# Patient Record
Sex: MALE | Race: AMERICAN INDIAN OR ALASKA NATIVE
[De-identification: names, ages, dates, MRNs, and addresses within clinical notes are randomized per-mention and may not be internally consistent; named-entity substitution may affect disease eponyms.]

---

## 2019-07-09 ENCOUNTER — HOSPITAL ENCOUNTER (EMERGENCY)
Dept: HOSPITAL 5 - ED | Age: 39
Discharge: HOME | End: 2019-07-09
Payer: COMMERCIAL

## 2019-07-09 VITALS — SYSTOLIC BLOOD PRESSURE: 135 MMHG | DIASTOLIC BLOOD PRESSURE: 81 MMHG

## 2019-07-09 DIAGNOSIS — R21: Primary | ICD-10-CM

## 2019-07-09 DIAGNOSIS — F17.200: ICD-10-CM

## 2019-07-09 PROCEDURE — 99282 EMERGENCY DEPT VISIT SF MDM: CPT

## 2019-07-09 NOTE — EMERGENCY DEPARTMENT REPORT
ED Rash [facility]





- [facility]


Chief Complaint: Skin Rash


Stated Complaint: BOTH HANDS PAIN


Time Seen by Provider: 07/09/19 21:12


Location: Upper Extremities (bilateral fingers)


Suspected Cause: Unknown


Rash Symptoms: Yes Blistering, No Itching, No Facial Swelling, No Tongue/Oral 

Swelling, No Breathing Difficulties, No Choking Sensation, No Wheezing/Dyspnea, 

No Peeling, No Fever, No Lightheaded, No Malaise, No Myalgias


Severity: mild





ED Review of Systems


ROS: 


Stated complaint: BOTH HANDS PAIN


Other details as noted in HPI








ED Past Medical Hx





- Past Medical History


Previous Medical History?: No





- Surgical History


Past Surgical History?: No





- Social History


Smoking Status: Current Every Day Smoker


Substance Use Type: Alcohol





- Medications


Home Medications: 


                                Home Medications











 Medication  Instructions  Recorded  Confirmed  Last Taken  Type


 


Betamethasone Valerate 1 applicatio TP BID #1 tube 07/09/19  Unknown Rx





[Betamethasone Valerate 0.1% Oint]     


 


cephALEXin [Keflex] 500 mg PO Q12HR #14 cap 07/09/19  Unknown Rx


 


methylPREDNISolone [Medrol 4MG 4 mg PO DAILY #1 tab.ds.pk 07/09/19  Unknown Rx





DOSEPAK (21 tabs)]     














Rash Exam





- Exam


General: 


Vital signs noted. No distress. Alert and acting appropriately.





HEENT: No Periorbital Edema, No Conjuctival Injection, No Chemosis, No Perioral 

Edema, No Tongue Edema, No Uvular Edema, No Compromised Airway, No Drooling


Lungs: Yes Good Air Exchange, No Wheezes, No Ronchi, No Stridor, No Cough, No 

Labored Respirations, No Retractions, No Use of Accessory Muscles, No Other 

Abnormal Lung Sounds





ED Course


                                   Vital Signs











  07/09/19





  21:09


 


Temperature 98.2 F


 


Pulse Rate 61


 


Respiratory 18





Rate 


 


Blood Pressure 135/81


 


O2 Sat by Pulse 99





Oximetry 














ED Medical Decision Making





- Medical Decision Making


39 y o male presents to ED with dry cracked type rash to his fingers


Pt was given antibiotic therapy with oral steroid and follow up intructions








- Differential Diagnosis


1. Impetigo 2. eczema 3. dermatitis


Critical care attestation.: 


If time is entered above; I have spent that time in minutes in the direct care 

of this critically ill patient, excluding procedure time.








ED Disposition


Clinical Impression: 


 Rash and nonspecific skin eruption





Disposition: DC-01 TO HOME OR SELFCARE


Is pt being admited?: No


Does the pt Need Aspirin: No


Condition: Stable


Instructions:  Impetigo (ED), Urticaria (ED)


Additional Instructions: 


follow up with [facility] in 3-5 days


take medication as prescribed


Return if worsening symptoms


Prescriptions: 


Betamethasone Valerate [Betamethasone Valerate 0.1% Oint] 1 applicatio TP BID #1

tube


cephALEXin [Keflex] 500 mg PO Q12HR #14 cap


methylPREDNISolone [Medrol 4MG DOSEPAK (21 tabs)] 4 mg PO DAILY #1 tab.ds.pk


Referrals: 


[facility] [Primary Care Provider] - 3-5 Days


[facility] [Outside] - 3-5 Days


[facility] [Outside] - 3-5 Days


[facility] [Provider Group] - 3-5 Days


Forms:  Accompanied Note, Work/School Release Form(ED)


Time of Disposition: 21:50